# Patient Record
Sex: FEMALE
[De-identification: names, ages, dates, MRNs, and addresses within clinical notes are randomized per-mention and may not be internally consistent; named-entity substitution may affect disease eponyms.]

---

## 2016-02-20 VITALS
SYSTOLIC BLOOD PRESSURE: 108 MMHG | SYSTOLIC BLOOD PRESSURE: 108 MMHG | DIASTOLIC BLOOD PRESSURE: 74 MMHG | DIASTOLIC BLOOD PRESSURE: 74 MMHG

## 2019-02-18 ENCOUNTER — HOSPITAL ENCOUNTER (OUTPATIENT)
Dept: HOSPITAL 61 - 3 SO LND | Age: 29
Setting detail: OBSERVATION
Discharge: HOME | End: 2019-02-18
Attending: SPECIALIST | Admitting: SPECIALIST
Payer: SELF-PAY

## 2019-02-18 VITALS — BODY MASS INDEX: 30.22 KG/M2 | WEIGHT: 142 LBS | HEIGHT: 57.5 IN

## 2019-02-18 DIAGNOSIS — Z3A.38: ICD-10-CM

## 2019-02-18 DIAGNOSIS — O21.2: ICD-10-CM

## 2019-02-18 LAB
ALBUMIN SERPL-MCNC: 2.4 G/DL (ref 3.4–5)
ALBUMIN/GLOB SERPL: 0.6 {RATIO} (ref 1–1.7)
ALP SERPL-CCNC: 194 U/L (ref 46–116)
ALT SERPL-CCNC: 18 U/L (ref 14–59)
AMPHETAMINE/METHAMPHETAMINE: (no result)
ANION GAP SERPL CALC-SCNC: 12 MMOL/L (ref 6–14)
APTT PPP: YELLOW S
AST SERPL-CCNC: 23 U/L (ref 15–37)
BACTERIA #/AREA URNS HPF: (no result) /HPF
BARBITURATES UR-MCNC: (no result) UG/ML
BASOPHILS # BLD AUTO: 0.1 X10^3/UL (ref 0–0.2)
BASOPHILS NFR BLD: 1 % (ref 0–3)
BENZODIAZ UR-MCNC: (no result) UG/L
BILIRUB SERPL-MCNC: 0.2 MG/DL (ref 0.2–1)
BILIRUB UR QL STRIP: NEGATIVE
BUN SERPL-MCNC: 6 MG/DL (ref 7–20)
BUN/CREAT SERPL: 12 (ref 6–20)
CALCIUM SERPL-MCNC: 9.3 MG/DL (ref 8.5–10.1)
CANNABINOIDS UR-MCNC: (no result) UG/L
CHLORIDE SERPL-SCNC: 100 MMOL/L (ref 98–107)
CO2 SERPL-SCNC: 23 MMOL/L (ref 21–32)
COCAINE UR-MCNC: (no result) NG/ML
CREAT SERPL-MCNC: 0.5 MG/DL (ref 0.6–1)
EOSINOPHIL NFR BLD: 0.2 X10^3/UL (ref 0–0.7)
EOSINOPHIL NFR BLD: 2 % (ref 0–3)
ERYTHROCYTE [DISTWIDTH] IN BLOOD BY AUTOMATED COUNT: 14.2 % (ref 11.5–14.5)
FIBRINOGEN PPP-MCNC: CLEAR MG/DL
GFR SERPLBLD BASED ON 1.73 SQ M-ARVRAT: 146.9 ML/MIN
GLOBULIN SER-MCNC: 4.2 G/DL (ref 2.2–3.8)
GLUCOSE SERPL-MCNC: 74 MG/DL (ref 70–99)
HCT VFR BLD CALC: 39.2 % (ref 36–47)
HGB BLD-MCNC: 13 G/DL (ref 12–15.5)
LYMPHOCYTES # BLD: 2.4 X10^3/UL (ref 1–4.8)
LYMPHOCYTES NFR BLD AUTO: 18 % (ref 24–48)
MCH RBC QN AUTO: 29 PG (ref 25–35)
MCHC RBC AUTO-ENTMCNC: 33 G/DL (ref 31–37)
MCV RBC AUTO: 87 FL (ref 79–100)
METHADONE SERPL-MCNC: (no result) NG/ML
MONO #: 0.5 X10^3/UL (ref 0–1.1)
MONOCYTES NFR BLD: 4 % (ref 0–9)
NEUT #: 10.2 X10^3UL (ref 1.8–7.7)
NEUTROPHILS NFR BLD AUTO: 76 % (ref 31–73)
NITRITE UR QL STRIP: NEGATIVE
OPIATES UR-MCNC: (no result) NG/ML
PCP SERPL-MCNC: (no result) MG/DL
PH UR STRIP: 8 [PH]
PLATELET # BLD AUTO: 235 X10^3/UL (ref 140–400)
POTASSIUM SERPL-SCNC: 4.1 MMOL/L (ref 3.5–5.1)
PROT SERPL-MCNC: 6.6 G/DL (ref 6.4–8.2)
PROT UR STRIP-MCNC: NEGATIVE MG/DL
RBC # BLD AUTO: 4.49 X10^6/UL (ref 3.5–5.4)
RBC #/AREA URNS HPF: (no result) /HPF (ref 0–2)
SODIUM SERPL-SCNC: 135 MMOL/L (ref 136–145)
SQUAMOUS #/AREA URNS LPF: (no result) /LPF
UROBILINOGEN UR-MCNC: 0.2 MG/DL
WBC # BLD AUTO: 13.4 X10^3/UL (ref 4–11)
WBC #/AREA URNS HPF: (no result) /HPF (ref 0–4)

## 2019-02-18 PROCEDURE — 36415 COLL VENOUS BLD VENIPUNCTURE: CPT

## 2019-02-18 PROCEDURE — 85025 COMPLETE CBC W/AUTO DIFF WBC: CPT

## 2019-02-18 PROCEDURE — 80053 COMPREHEN METABOLIC PANEL: CPT

## 2019-02-18 PROCEDURE — G0378 HOSPITAL OBSERVATION PER HR: HCPCS

## 2019-02-18 PROCEDURE — 80307 DRUG TEST PRSMV CHEM ANLYZR: CPT

## 2019-02-18 PROCEDURE — 96361 HYDRATE IV INFUSION ADD-ON: CPT

## 2019-02-18 PROCEDURE — 87086 URINE CULTURE/COLONY COUNT: CPT

## 2019-02-18 PROCEDURE — 81001 URINALYSIS AUTO W/SCOPE: CPT

## 2019-02-18 PROCEDURE — 96374 THER/PROPH/DIAG INJ IV PUSH: CPT

## 2019-02-18 PROCEDURE — G0379 DIRECT REFER HOSPITAL OBSERV: HCPCS

## 2019-02-22 ENCOUNTER — HOSPITAL ENCOUNTER (OUTPATIENT)
Dept: HOSPITAL 61 - 3 SO LND | Age: 29
Setting detail: OBSERVATION
Discharge: HOME | End: 2019-02-22
Attending: SPECIALIST | Admitting: SPECIALIST
Payer: SELF-PAY

## 2019-02-22 DIAGNOSIS — Z3A.38: ICD-10-CM

## 2019-02-22 DIAGNOSIS — O26.893: ICD-10-CM

## 2019-02-22 DIAGNOSIS — N89.8: ICD-10-CM

## 2019-02-22 DIAGNOSIS — O42.92: Primary | ICD-10-CM

## 2019-02-22 LAB
AMNIO PT: NEGATIVE
APTT PPP: YELLOW S
BACTERIA #/AREA URNS HPF: (no result) /HPF
BILIRUB UR QL STRIP: NEGATIVE
FIBRINOGEN PPP-MCNC: CLEAR MG/DL
NITRITE UR QL STRIP: NEGATIVE
PH UR STRIP: 7.5 [PH]
PROT UR STRIP-MCNC: NEGATIVE MG/DL
RBC #/AREA URNS HPF: (no result) /HPF (ref 0–2)
SQUAMOUS #/AREA URNS LPF: (no result) /LPF
UROBILINOGEN UR-MCNC: 0.2 MG/DL
WBC #/AREA URNS HPF: (no result) /HPF (ref 0–4)

## 2019-02-22 PROCEDURE — 81001 URINALYSIS AUTO W/SCOPE: CPT

## 2019-02-22 PROCEDURE — 84112 EVAL AMNIOTIC FLUID PROTEIN: CPT

## 2019-02-22 PROCEDURE — 36415 COLL VENOUS BLD VENIPUNCTURE: CPT

## 2019-02-22 PROCEDURE — G0378 HOSPITAL OBSERVATION PER HR: HCPCS

## 2019-02-22 PROCEDURE — 87086 URINE CULTURE/COLONY COUNT: CPT

## 2019-02-22 PROCEDURE — G0379 DIRECT REFER HOSPITAL OBSERV: HCPCS

## 2019-02-24 ENCOUNTER — HOSPITAL ENCOUNTER (INPATIENT)
Dept: HOSPITAL 61 - 3 SO LND | Age: 29
LOS: 3 days | Discharge: HOME | End: 2019-02-27
Attending: SPECIALIST | Admitting: SPECIALIST
Payer: MEDICAID

## 2019-02-24 VITALS — WEIGHT: 144 LBS | BODY MASS INDEX: 29.03 KG/M2 | HEIGHT: 59 IN

## 2019-02-24 VITALS — DIASTOLIC BLOOD PRESSURE: 81 MMHG | SYSTOLIC BLOOD PRESSURE: 130 MMHG

## 2019-02-24 DIAGNOSIS — Z3A.40: ICD-10-CM

## 2019-02-24 LAB
AMORPH SED URNS QL MICRO: PRESENT /HPF
AMPHETAMINE/METHAMPHETAMINE: (no result)
APTT PPP: YELLOW S
BACTERIA #/AREA URNS HPF: (no result) /HPF
BARBITURATES UR-MCNC: (no result) UG/ML
BENZODIAZ UR-MCNC: (no result) UG/L
BILIRUB UR QL STRIP: NEGATIVE
CANNABINOIDS UR-MCNC: (no result) UG/L
COCAINE UR-MCNC: (no result) NG/ML
FIBRINOGEN PPP-MCNC: CLEAR MG/DL
METHADONE SERPL-MCNC: (no result) NG/ML
NITRITE UR QL STRIP: NEGATIVE
OPIATES UR-MCNC: (no result) NG/ML
PCP SERPL-MCNC: (no result) MG/DL
PH UR STRIP: 8 [PH]
PROT UR STRIP-MCNC: NEGATIVE MG/DL
RBC #/AREA URNS HPF: (no result) /HPF (ref 0–2)
SQUAMOUS #/AREA URNS LPF: (no result) /LPF
UROBILINOGEN UR-MCNC: 0.2 MG/DL
WBC #/AREA URNS HPF: (no result) /HPF (ref 0–4)

## 2019-02-24 PROCEDURE — 87086 URINE CULTURE/COLONY COUNT: CPT

## 2019-02-24 PROCEDURE — 86592 SYPHILIS TEST NON-TREP QUAL: CPT

## 2019-02-24 PROCEDURE — 86901 BLOOD TYPING SEROLOGIC RH(D): CPT

## 2019-02-24 PROCEDURE — 85014 HEMATOCRIT: CPT

## 2019-02-24 PROCEDURE — 36415 COLL VENOUS BLD VENIPUNCTURE: CPT

## 2019-02-24 PROCEDURE — 85025 COMPLETE CBC W/AUTO DIFF WBC: CPT

## 2019-02-24 PROCEDURE — 86900 BLOOD TYPING SEROLOGIC ABO: CPT

## 2019-02-24 PROCEDURE — 80307 DRUG TEST PRSMV CHEM ANLYZR: CPT

## 2019-02-24 PROCEDURE — 86850 RBC ANTIBODY SCREEN: CPT

## 2019-02-24 PROCEDURE — 81001 URINALYSIS AUTO W/SCOPE: CPT

## 2019-02-25 VITALS — SYSTOLIC BLOOD PRESSURE: 97 MMHG | DIASTOLIC BLOOD PRESSURE: 55 MMHG

## 2019-02-25 VITALS — DIASTOLIC BLOOD PRESSURE: 70 MMHG | SYSTOLIC BLOOD PRESSURE: 113 MMHG

## 2019-02-25 VITALS — DIASTOLIC BLOOD PRESSURE: 63 MMHG | SYSTOLIC BLOOD PRESSURE: 109 MMHG

## 2019-02-25 VITALS — DIASTOLIC BLOOD PRESSURE: 64 MMHG | SYSTOLIC BLOOD PRESSURE: 113 MMHG

## 2019-02-25 VITALS — SYSTOLIC BLOOD PRESSURE: 97 MMHG | DIASTOLIC BLOOD PRESSURE: 60 MMHG

## 2019-02-25 LAB
BASOPHILS # BLD AUTO: 0 X10^3/UL (ref 0–0.2)
BASOPHILS NFR BLD: 0 % (ref 0–3)
EOSINOPHIL NFR BLD: 0.3 X10^3/UL (ref 0–0.7)
EOSINOPHIL NFR BLD: 2 % (ref 0–3)
ERYTHROCYTE [DISTWIDTH] IN BLOOD BY AUTOMATED COUNT: 14.1 % (ref 11.5–14.5)
HCT VFR BLD CALC: 39.3 % (ref 36–47)
HGB BLD-MCNC: 12.9 G/DL (ref 12–15.5)
LYMPHOCYTES # BLD: 3.5 X10^3/UL (ref 1–4.8)
LYMPHOCYTES NFR BLD AUTO: 22 % (ref 24–48)
MCH RBC QN AUTO: 29 PG (ref 25–35)
MCHC RBC AUTO-ENTMCNC: 33 G/DL (ref 31–37)
MCV RBC AUTO: 87 FL (ref 79–100)
MONO #: 0.6 X10^3/UL (ref 0–1.1)
MONOCYTES NFR BLD: 4 % (ref 0–9)
NEUT #: 11 X10^3UL (ref 1.8–7.7)
NEUTROPHILS NFR BLD AUTO: 71 % (ref 31–73)
PLATELET # BLD AUTO: 257 X10^3/UL (ref 140–400)
RBC # BLD AUTO: 4.53 X10^6/UL (ref 3.5–5.4)
WBC # BLD AUTO: 15.4 X10^3/UL (ref 4–11)

## 2019-02-25 RX ADMIN — DOCUSATE SODIUM PRN MG: 100 CAPSULE, LIQUID FILLED ORAL at 19:49

## 2019-02-25 RX ADMIN — HYDROCODONE BITARTRATE AND ACETAMINOPHEN PRN TAB: 5; 325 TABLET ORAL at 12:37

## 2019-02-25 RX ADMIN — NALBUPHINE HYDROCHLORIDE PRN MG: 10 INJECTION, SOLUTION INTRAMUSCULAR; INTRAVENOUS; SUBCUTANEOUS at 01:14

## 2019-02-25 RX ADMIN — NALBUPHINE HYDROCHLORIDE PRN MG: 10 INJECTION, SOLUTION INTRAMUSCULAR; INTRAVENOUS; SUBCUTANEOUS at 02:25

## 2019-02-25 RX ADMIN — HYDROCODONE BITARTRATE AND ACETAMINOPHEN PRN TAB: 5; 325 TABLET ORAL at 08:35

## 2019-02-25 RX ADMIN — IBUPROFEN SCH MG: 400 TABLET ORAL at 19:50

## 2019-02-25 RX ADMIN — IBUPROFEN SCH MG: 400 TABLET ORAL at 06:17

## 2019-02-25 RX ADMIN — DOCUSATE SODIUM PRN MG: 100 CAPSULE, LIQUID FILLED ORAL at 08:34

## 2019-02-26 VITALS — DIASTOLIC BLOOD PRESSURE: 73 MMHG | SYSTOLIC BLOOD PRESSURE: 112 MMHG

## 2019-02-26 VITALS — DIASTOLIC BLOOD PRESSURE: 62 MMHG | SYSTOLIC BLOOD PRESSURE: 91 MMHG

## 2019-02-26 VITALS — DIASTOLIC BLOOD PRESSURE: 67 MMHG | SYSTOLIC BLOOD PRESSURE: 100 MMHG

## 2019-02-26 VITALS — SYSTOLIC BLOOD PRESSURE: 97 MMHG | DIASTOLIC BLOOD PRESSURE: 56 MMHG

## 2019-02-26 RX ADMIN — IBUPROFEN SCH MG: 400 TABLET ORAL at 05:40

## 2019-02-26 RX ADMIN — IBUPROFEN SCH MG: 400 TABLET ORAL at 15:27

## 2019-02-26 RX ADMIN — IBUPROFEN SCH MG: 400 TABLET ORAL at 21:54

## 2019-02-26 RX ADMIN — DOCUSATE SODIUM PRN MG: 100 CAPSULE, LIQUID FILLED ORAL at 21:53

## 2019-02-26 RX ADMIN — DOCUSATE SODIUM PRN MG: 100 CAPSULE, LIQUID FILLED ORAL at 15:27

## 2019-02-26 NOTE — PDOC
Provider Note


Provider Note


Doing well


VSS


Uterus NTTP


FU in AM











FAM DINH MD Feb 26, 2019 13:31

## 2019-02-27 VITALS — DIASTOLIC BLOOD PRESSURE: 63 MMHG | SYSTOLIC BLOOD PRESSURE: 99 MMHG

## 2019-02-27 VITALS — SYSTOLIC BLOOD PRESSURE: 110 MMHG | DIASTOLIC BLOOD PRESSURE: 78 MMHG

## 2019-02-27 VITALS — SYSTOLIC BLOOD PRESSURE: 99 MMHG | DIASTOLIC BLOOD PRESSURE: 66 MMHG

## 2019-02-27 RX ADMIN — IBUPROFEN SCH MG: 400 TABLET ORAL at 06:27

## 2019-02-27 RX ADMIN — DOCUSATE SODIUM PRN MG: 100 CAPSULE, LIQUID FILLED ORAL at 08:29

## 2019-02-27 NOTE — PDOC3
OB DISCHARGE SUMMARY


DATE OF ADMISSION:  


2/25/19


DATE OF DISCHARGE:  


2/17/19


REASON FOR ADMISSION:  Onset of labor


INTRAPARTUM PROCEDURES:  Spontanous Vag Deliv


DISCHARGE DIAGNOSIS:  Term Pregnancy Delivered


DISCHARGE INFORMATION:  Activity (ad chantell), Diet (regular), Instructions (pelvic 

rest x 6 wks.)


HOSPITAL COURSE


Term gestation delivered vaginally without any complications.











YESICA CAAL Jr, MD Feb 27, 2019 16:52

## 2019-03-11 NOTE — PDOC1
OB - History


Hx of Present Pregnancy


Prenatal Care:  Good Care


Ultrasounds:  Normal mid trimester US


Obstetrical Complications:  None


Medical Complications:  None





Past Family/Social History


*


Past Medical, Surgical, Family and Obstetric Histories reviewed from prenatal 

chart.


Blood Type:  O+


Rubella:  Immune


RPR/VDRL:  Negative


HBsAG:  Negative





OB - Chief Complaint & HPI


Date of Admission:


Date of Admission:  2019 at 22:55


Chief Complaint/History


:  2


Para:  1


EDC:  Mar 6, 2019


Reason for admission:  active labor


Admission Nurse Assessment Rev:  Yes





OB - Admission Exam


Physical Exam


HEENT:  Normal, Nasal Mucosa Normal, Oropharynx Normal, Moist Membranes, 

Fontanelles Normal


Heart:  Regular Rate


Lungs:  Clear, Equal


Abdomen:  Gravid


Extremities:  Normal Pulses, No tenderness or swelling


Reflexes:  Normal


Effacement:  100%


Membranes:  Ruptured


Amniotic Fluid:  Clear


Fetal Heart Rate:  Normal


Accelerations:  Accelerations Present


Decelerations:  Variable decelerations


Short Term Variability:  Present


Long Term Variability:  Moderate


Contractions on Admission:  < 5 Minutes Apart


Intensity:  Firm





Assessment/Plan


Assessment/Plan


TIUP


Active labor


ACS











FAM DINH MD Mar 11, 2019 08:18

## 2019-03-11 NOTE — PDOC
VAGINAL DELIVERY


DATE


DATE: 3/11/19 


TIME: 08:18


:  2


Para:  1


EDC:  Mar 6, 2019


VAGINAL DELIVERY:  VTX


VACCUM ASSISTED:  No


PLACENTA:  Spontaneous


APGAR





SEX:  Male


WEIGHT


Weight [ ]


EPISIOTOMY:  No


EXTENSION:  Yes


EBL


300cc


COMPLICATIONS


None


CONDITION


Stable


Signs of Intrauterine Infectio:  None


Shoulder Dystocia:  No


DIAGNOSIS


FAM Woodruff MD Mar 11, 2019 08:20

## 2021-10-22 ENCOUNTER — HOSPITAL ENCOUNTER (EMERGENCY)
Dept: HOSPITAL 61 - ER | Age: 31
Discharge: HOME | End: 2021-10-22
Payer: SELF-PAY

## 2021-10-22 VITALS — WEIGHT: 130.07 LBS | HEIGHT: 61 IN | BODY MASS INDEX: 24.56 KG/M2

## 2021-10-22 VITALS — SYSTOLIC BLOOD PRESSURE: 115 MMHG | DIASTOLIC BLOOD PRESSURE: 56 MMHG

## 2021-10-22 DIAGNOSIS — O03.9: Primary | ICD-10-CM

## 2021-10-22 DIAGNOSIS — Z3A.08: ICD-10-CM

## 2021-10-22 LAB
BASOPHILS # BLD AUTO: 0 X10^3/UL (ref 0–0.2)
BASOPHILS NFR BLD: 1 % (ref 0–3)
EOSINOPHIL NFR BLD: 0.2 X10^3/UL (ref 0–0.7)
EOSINOPHIL NFR BLD: 3 % (ref 0–3)
ERYTHROCYTE [DISTWIDTH] IN BLOOD BY AUTOMATED COUNT: 13.7 % (ref 11.5–14.5)
HCT VFR BLD CALC: 38.9 % (ref 36–47)
HGB BLD-MCNC: 12.9 G/DL (ref 12–15.5)
LYMPHOCYTES # BLD: 2.5 X10^3/UL (ref 1–4.8)
LYMPHOCYTES NFR BLD AUTO: 31 % (ref 24–48)
MCH RBC QN AUTO: 29 PG (ref 25–35)
MCHC RBC AUTO-ENTMCNC: 33 G/DL (ref 31–37)
MCV RBC AUTO: 88 FL (ref 79–100)
MONO #: 0.3 X10^3/UL (ref 0–1.1)
MONOCYTES NFR BLD: 4 % (ref 0–9)
NEUT #: 5 X10^3/UL (ref 1.8–7.7)
NEUTROPHILS NFR BLD AUTO: 62 % (ref 31–73)
PLATELET # BLD AUTO: 223 X10^3/UL (ref 140–400)
RBC # BLD AUTO: 4.43 X10^6/UL (ref 3.5–5.4)
WBC # BLD AUTO: 8.1 X10^3/UL (ref 4–11)

## 2021-10-22 PROCEDURE — 76801 OB US < 14 WKS SINGLE FETUS: CPT

## 2021-10-22 PROCEDURE — 86900 BLOOD TYPING SEROLOGIC ABO: CPT

## 2021-10-22 PROCEDURE — 85025 COMPLETE CBC W/AUTO DIFF WBC: CPT

## 2021-10-22 PROCEDURE — 86901 BLOOD TYPING SEROLOGIC RH(D): CPT

## 2021-10-22 PROCEDURE — 81025 URINE PREGNANCY TEST: CPT

## 2021-10-22 PROCEDURE — 84702 CHORIONIC GONADOTROPIN TEST: CPT

## 2021-10-22 PROCEDURE — 36415 COLL VENOUS BLD VENIPUNCTURE: CPT

## 2021-10-22 NOTE — PHYS DOC
Past Medical History


Past Surgical History:  No Surgical History





General Adult


EDM:


Chief Complaint:  VAGINAL BLEEDING PREGNANCY





HPI:


HPI:





Patient is a 31  year old female G3, P2 at approximately 8 weeks gestational age

who presents with vaginal bleeding, back pain, and uterine 

cramping/contractions.


States that she began bleeding approximately 1 week ago.  Has passed clots, and 

yesterday, passed thicker tissue-like material.


She has had an ultrasound at approximately 7 weeks it did reveal an IUP.  This 

was done at a clinic, however, she does not know the name of the clinic.


States that she does not have any regular OB follow-up.





Review of Systems:


Review of Systems:


Constitutional:   Denies fever or chills. []


Eyes:   Denies change in visual acuity. []


HENT:   Denies nasal congestion or sore throat. [] 


Respiratory:   Denies cough or shortness of breath. [] 


Cardiovascular:   Denies chest pain or edema. [] 


GI: Reports lower abdominal/uterine pain.  Denies nausea, vomiting, bloody 

stools or diarrhea. [] 


: Reports vaginal bleeding, uterine cramping, ?  Passage of tissue


Musculoskeletal: Reports low back pain.  Denies joint pain. [] 


Integument:   Denies rash. [] 


Neurologic:   Denies headache, focal weakness or sensory changes. [] 


Endocrine:   Denies polyuria or polydipsia. [] 


Lymphatic:  Denies swollen glands. [] 


Psychiatric:  Denies depression or anxiety. []





Heart Score:


C/O Chest Pain:  N/A





Allergies:


Allergies:





Allergies








Coded Allergies Type Severity Reaction Last Updated Verified


 


  No Known Drug Allergies    16 No











Physical Exam:


PE:





Constitutional: Well developed, well nourished, no acute distress, non-toxic 

appearance. []


HENT: Normocephalic, atraumatic, bilateral external ears normal, oropharynx 

moist, no oral exudates, nose normal. []


Eyes: PERRLA, EOMI, conjunctiva normal, no discharge. [] 


Neck: Normal range of motion, no tenderness, supple, no stridor. [] 


Cardiovascular:Heart rate regular rhythm, no murmur []


Lungs & Thorax:  Bilateral breath sounds clear to auscultation []


Abdomen: Mild lower abdominal tenderness to palpation.  Soft, nonrigid.  No 

rebound.  


:  External genitalia normal.  Small amount of blood present in the vaginal 

vault.  Cervix visualized without lesions.  Cervix does appear open and had a 

small amount of what appears to be fetal tissue present.  This was removed with 

forceps.  No ongoing bleeding was noted.


Skin: Warm, dry, no erythema, no rash. [] 


Back: No tenderness, no CVA tenderness. [] 


Extremities: No tenderness, no cyanosis, no clubbing, ROM intact, no edema. [] 


Neurologic: Alert and oriented X 3, normal motor function, normal sensory 

function, no focal deficits noted. []


Psychologic: Affect normal, judgement normal, mood normal. []





Current Patient Data:


Labs:





                                Laboratory Tests








Test


 10/22/21


09:31


 


POC Urine HCG, Qualitative


 Hcg positive


(Negative)








Vital Signs:





                                   Vital Signs








  Date Time  Temp Pulse Resp B/P (MAP) Pulse Ox O2 Delivery O2 Flow Rate FiO2


 


10/22/21 09:31 98.5 60 16 101/61 (74) 99 Room Air  





 98.5       











EKG:


EKG:


[]





Radiology/Procedures:


Radiology/Procedures:


[]


Impression:


                            [facility]


                    [address] [place] [address]


                                 [phone]


                                        


                                 IMAGING REPORT





                                     Signed





PATIENT: PATRICIA NAVA ACCOUNT: [id number]     MRN#: [id number]


: 1990           LOCATION: ER              AGE: 31


SEX: F                    EXAM DT: 10/22/21         ACCESSION#: [id number]


STATUS: REG ER            ORD. PHYSICIAN: FAM PAREKH MD


REASON: 1st trimester bleeding, cramping


PROCEDURE: OB < 14 WKS





OB ultrasound less than 14 weeks 10/22/2021





CLINICAL HISTORY: First trimester pregnancy with uncertain LMP with vaginal 

bleeding and cramping.





TECHNIQUE: Using the distended urinary bladder as a sonographic window, a real-

time ultrasound examination of the pelvis was performed. Additionally in an 

attempt to better evaluate the uterus and adnexa, a transvaginal ultrasound 

study was performed. Multiple images were obtained.





FINDINGS: The uterus is within normal limits in size and echogenicity. It 

measures 8.1 x 4.4 x 4.1 cm in longitudinal, transverse, and the AP dimensions. 

The endometrial echo complex measures 7 mm in thickness which is within normal 

limits. No gestational sac is seen within the endometrial canal. No focal 

abnormality of the uterus is seen.





Both ovaries are within normal limits in size and echogenicity. The right ovary 

measures 2.4 x 1.4 x 1.4 cm in size. The left ovary measures 2.0 x 1.3 x 1.1 cm 

in size. A 9 mm follicle is seen within the right ovary. No adnexal mass is 

seen. Normal color-flow and pulse Doppler imaging to both ovaries is seen. No 

free fluid is noted.





IMPRESSION: Negative study. No IUP is seen. This ultrasound finding could be 

seen with an very early IUP, missed spontaneous  or possibly due to an 

occult ectopic pregnancy. Clinical correlation and correlation with the 

patient's serial beta hCG level is recommended.





Electronically signed by: Franco Morin MD (10/22/2021 11:46 AM) [id number]














DICTATED and SIGNED BY:     FRANCO MORIN MD


DATE:     10/22/21 [id number] 0





Course & Med Decision Making:


Course & Med Decision Making


Pertinent Labs and Imaging studies reviewed. (See chart for details)





Patient is a 31-year-old female G3, P2 at approximately 8 weeks gestational age 

who presents with a week of vaginal bleeding, lower abdominal 

cramping/contractions, low back pain, and passage of ?  Fetal tissue.


Hemodynamically stable on arrival.


Well-appearing on exam.


Concern for miscarriage.  Ultrasound, beta-hCG, blood counts, and blood type 

(since Rh status is unknown) ordered.


1155





B-hcg 649. 


US without IUP.


Given she had a confirmed IUP ~ 1 week ago, this is likely a miscarriage. 


Blood type: O+. No rhogam indicated. 


Small amount of likely fetal tissue was seen on pelvic examination and removed 

with forceps.  Cervical os appeared open.


She was provided with an OB follow up.


Explained expected clinical course and given return precautions for increased 

bleeding, pain, or fever/chills.


1246





Dragon Disclaimer:


Dragon Disclaimer:


This electronic medical record was generated, in whole or in part, using a voice

 recognition dictation system.





Departure


Departure


Impression:  


   Primary Impression:  


   Miscarriage


Disposition:   HOME HEALTH CARE SERVICE


Condition:  STABLE


Referrals:  


NO PCP (PCP)








KOREY DAVILA MD


Schedule a follow up appointment.


Patient Instructions:  Miscarriage











FAM PAREKH MD              Oct 22, 2021 11:55

## 2021-10-22 NOTE — RAD
OB ultrasound less than 14 weeks 10/22/2021



CLINICAL HISTORY: First trimester pregnancy with uncertain LMP with vaginal bleeding and cramping.



TECHNIQUE: Using the distended urinary bladder as a sonographic window, a real-time ultrasound examin
ation of the pelvis was performed. Additionally in an attempt to better evaluate the uterus and adnex
a, a transvaginal ultrasound study was performed. Multiple images were obtained.



FINDINGS: The uterus is within normal limits in size and echogenicity. It measures 8.1 x 4.4 x 4.1 cm
 in longitudinal, transverse, and the AP dimensions. The endometrial echo complex measures 7 mm in th
ickness which is within normal limits. No gestational sac is seen within the endometrial canal. No fo
patricia abnormality of the uterus is seen.



Both ovaries are within normal limits in size and echogenicity. The right ovary measures 2.4 x 1.4 x 
1.4 cm in size. The left ovary measures 2.0 x 1.3 x 1.1 cm in size. A 9 mm follicle is seen within th
e right ovary. No adnexal mass is seen. Normal color-flow and pulse Doppler imaging to both ovaries i
s seen. No free fluid is noted.



IMPRESSION: Negative study. No IUP is seen. This ultrasound finding could be seen with an very early 
IUP, missed spontaneous  or possibly due to an occult ectopic pregnancy. Clinical correlation
 and correlation with the patient's serial beta hCG level is recommended.



Electronically signed by: Frnaco Morin MD (10/22/2021 11:46 AM) [id number]

## 2021-12-03 ENCOUNTER — HOSPITAL ENCOUNTER (EMERGENCY)
Dept: HOSPITAL 61 - ER | Age: 31
Discharge: HOME | End: 2021-12-03
Payer: SELF-PAY

## 2021-12-03 VITALS — WEIGHT: 126.55 LBS | BODY MASS INDEX: 23.29 KG/M2 | HEIGHT: 62 IN

## 2021-12-03 VITALS — SYSTOLIC BLOOD PRESSURE: 111 MMHG | DIASTOLIC BLOOD PRESSURE: 53 MMHG

## 2021-12-03 DIAGNOSIS — O23.41: Primary | ICD-10-CM

## 2021-12-03 DIAGNOSIS — Z20.822: ICD-10-CM

## 2021-12-03 LAB
ALBUMIN SERPL-MCNC: 3.4 G/DL (ref 3.4–5)
ALBUMIN/GLOB SERPL: 0.8 {RATIO} (ref 1–1.7)
ALP SERPL-CCNC: 97 U/L (ref 46–116)
ALT SERPL-CCNC: 28 U/L (ref 14–59)
ANION GAP SERPL CALC-SCNC: 2 MMOL/L (ref 6–14)
APTT PPP: YELLOW S
AST SERPL-CCNC: 20 U/L (ref 15–37)
BACTERIA #/AREA URNS HPF: (no result) /HPF
BASOPHILS # BLD AUTO: 0 X10^3/UL (ref 0–0.2)
BASOPHILS NFR BLD: 0 % (ref 0–3)
BILIRUB SERPL-MCNC: 0.3 MG/DL (ref 0.2–1)
BILIRUB UR QL STRIP: NEGATIVE
BUN SERPL-MCNC: 10 MG/DL (ref 7–20)
BUN/CREAT SERPL: 17 (ref 6–20)
CALCIUM SERPL-MCNC: 9.1 MG/DL (ref 8.5–10.1)
CHLORIDE SERPL-SCNC: 101 MMOL/L (ref 98–107)
CO2 SERPL-SCNC: 27 MMOL/L (ref 21–32)
CREAT SERPL-MCNC: 0.6 MG/DL (ref 0.6–1)
EOSINOPHIL NFR BLD: 0.2 X10^3/UL (ref 0–0.7)
EOSINOPHIL NFR BLD: 3 % (ref 0–3)
ERYTHROCYTE [DISTWIDTH] IN BLOOD BY AUTOMATED COUNT: 13.7 % (ref 11.5–14.5)
FIBRINOGEN PPP-MCNC: CLEAR MG/DL
GFR SERPLBLD BASED ON 1.73 SQ M-ARVRAT: 117.4 ML/MIN
GLUCOSE SERPL-MCNC: 77 MG/DL (ref 70–99)
HCT VFR BLD CALC: 41.1 % (ref 36–47)
HGB BLD-MCNC: 13.7 G/DL (ref 12–15.5)
INFLUENZA A PATIENT: NEGATIVE
INFLUENZA B PATIENT: NEGATIVE
LIPASE: 87 U/L (ref 73–393)
LYMPHOCYTES # BLD: 3 X10^3/UL (ref 1–4.8)
LYMPHOCYTES NFR BLD AUTO: 33 % (ref 24–48)
MCH RBC QN AUTO: 29 PG (ref 25–35)
MCHC RBC AUTO-ENTMCNC: 33 G/DL (ref 31–37)
MCV RBC AUTO: 88 FL (ref 79–100)
MONO #: 0.4 X10^3/UL (ref 0–1.1)
MONOCYTES NFR BLD: 4 % (ref 0–9)
NEUT #: 5.5 X10^3/UL (ref 1.8–7.7)
NEUTROPHILS NFR BLD AUTO: 60 % (ref 31–73)
NITRITE UR QL STRIP: NEGATIVE
PH UR STRIP: 7.5 [PH]
PLATELET # BLD AUTO: 249 X10^3/UL (ref 140–400)
POTASSIUM SERPL-SCNC: 3.8 MMOL/L (ref 3.5–5.1)
PROT SERPL-MCNC: 7.9 G/DL (ref 6.4–8.2)
PROT UR STRIP-MCNC: NEGATIVE MG/DL
RBC # BLD AUTO: 4.68 X10^6/UL (ref 3.5–5.4)
RBC #/AREA URNS HPF: 0 /HPF (ref 0–2)
SODIUM SERPL-SCNC: 130 MMOL/L (ref 136–145)
U PREG PATIENT: POSITIVE
UROBILINOGEN UR-MCNC: 0.2 MG/DL
WBC # BLD AUTO: 9.2 X10^3/UL (ref 4–11)
WBC #/AREA URNS HPF: (no result) /HPF (ref 0–4)

## 2021-12-03 PROCEDURE — 80053 COMPREHEN METABOLIC PANEL: CPT

## 2021-12-03 PROCEDURE — U0003 INFECTIOUS AGENT DETECTION BY NUCLEIC ACID (DNA OR RNA); SEVERE ACUTE RESPIRATORY SYNDROME CORONAVIRUS 2 (SARS-COV-2) (CORONAVIRUS DISEASE [COVID-19]), AMPLIFIED PROBE TECHNIQUE, MAKING USE OF HIGH THROUGHPUT TECHNOLOGIES AS DESCRIBED BY CMS-2020-01-R: HCPCS

## 2021-12-03 PROCEDURE — 83605 ASSAY OF LACTIC ACID: CPT

## 2021-12-03 PROCEDURE — 99285 EMERGENCY DEPT VISIT HI MDM: CPT

## 2021-12-03 PROCEDURE — 81001 URINALYSIS AUTO W/SCOPE: CPT

## 2021-12-03 PROCEDURE — 81025 URINE PREGNANCY TEST: CPT

## 2021-12-03 PROCEDURE — 96360 HYDRATION IV INFUSION INIT: CPT

## 2021-12-03 PROCEDURE — 83690 ASSAY OF LIPASE: CPT

## 2021-12-03 PROCEDURE — 84702 CHORIONIC GONADOTROPIN TEST: CPT

## 2021-12-03 PROCEDURE — 85025 COMPLETE CBC W/AUTO DIFF WBC: CPT

## 2021-12-03 PROCEDURE — 87147 CULTURE TYPE IMMUNOLOGIC: CPT

## 2021-12-03 PROCEDURE — 87591 N.GONORRHOEAE DNA AMP PROB: CPT

## 2021-12-03 PROCEDURE — 87086 URINE CULTURE/COLONY COUNT: CPT

## 2021-12-03 PROCEDURE — 76817 TRANSVAGINAL US OBSTETRIC: CPT

## 2021-12-03 PROCEDURE — 36415 COLL VENOUS BLD VENIPUNCTURE: CPT

## 2021-12-03 PROCEDURE — 87804 INFLUENZA ASSAY W/OPTIC: CPT

## 2021-12-03 PROCEDURE — 87426 SARSCOV CORONAVIRUS AG IA: CPT

## 2021-12-03 PROCEDURE — 87491 CHLMYD TRACH DNA AMP PROBE: CPT

## 2021-12-03 PROCEDURE — 76801 OB US < 14 WKS SINGLE FETUS: CPT

## 2021-12-03 NOTE — RAD
US OB <14 WKS +TV



History: Reason: MISCARRIAGE 1 MONTH AGO, NEVER FOLLOWED UP, FEVER, CHILLS / Spl. Instructions:  / Hi
story: 



Comparison: October 22, 2021. 



Technique: Grayscale and color Doppler imaging of the pelvis was performed using transabdominal techn
ique.



Findings:

The uterus measures 8.4 x 4.7 x 3.6 cm.  



Single intrauterine gestational sac with regular appearance. Yolk sac is identified. Fetal pole is id
entified with crown-rump length 0.25 cm. Estimated gestational age by ultrasound 5 weeks 6 days. Feta
l heart rate 103 bpm. No perigestational fluid collection.



Right ovary measures 2.8 x 2.3 x 2.0 cm. Dominant right ovarian follicle measures 1.8 cm, may represe
nt corpus luteal cyst.



Left ovary measures 2.4 x 1.0 x 1.3 cm. 



Normal Doppler flow to the ovaries bilaterally. No adnexal masses are seen.



IMPRESSION:

1.  Single intrauterine pregnancy with gestational age 5 weeks 6 days and fetal heart rate 103 bpm. R
ecommend continued ultrasound follow-up and routine fetal anatomic screening at 18-22 weeks. 



Electronically signed by: Mango Fish DO (12/3/2021 12:04 PM) [id number]

## 2021-12-03 NOTE — PHYS DOC
Past Medical History


Past Surgical History:  Other


Additional Past Surgical Histo:   10/2021


Smoking Status:  Never Smoker


Alcohol Use:  None





General Adult


EDM:


Chief Complaint:  OTHER COMPLAINTS





HPI:


HPI:





Patient is a 31  year old female who presents with miscarriage  and 

was seen here but never followed up with a OB doctor.  She states 3 weeks ago 

she took a pregnancy test and it was positive.  She states she is having back 

pain, chills and generalized weakness.  She is worried she is having another 

miscarriage or may be there was products of conception still in her uterus.  She

is G3, P2.  She rates her back pain a 7 out of 10.  She denies chest pain, 

shortness of breath, nausea, vomiting, diarrhea, vaginal discharge, vaginal 

bleeding, concern for STD, urinary symptoms, constipation headache, dizziness.





Review of Systems:


Review of Systems:


Constitutional:   Denies fever or +chills. []


Eyes:   Denies change in visual acuity. []


HENT:   Denies nasal congestion or sore throat. [] 


Respiratory:   Denies cough or shortness of breath. [] 


Cardiovascular:   Denies chest pain or edema. [] 


GI:   Denies abdominal pain, nausea, vomiting, bloody stools or diarrhea. [] 


:  Denies dysuria. [] 


Musculoskeletal:  + Lower back pain or denies joint pain. [] 


Integument:   Denies rash. [] 


Neurologic:   Denies headache, focal weakness or sensory changes. + Generalized 

weakness [] 


Endocrine:   Denies polyuria or polydipsia. [] 


Lymphatic:  Denies swollen glands. [] 


Psychiatric:  Denies depression or anxiety. []





Heart Score:


C/O Chest Pain:  No





Current Medications:





Current Medications








 Medications


  (Trade)  Dose


 Ordered  Sig/[facility]  Start Time


 Stop Time Status Last Admin


Dose Admin


 


 Acetaminophen


  (Tylenol)  1,000 mg  1X  ONCE  12/3/21 09:30


 12/3/21 09:31 DC  





 


 Sodium Chloride  1,000 ml @ 


 1,000 mls/hr  Q1H  12/3/21 09:30


 12/3/21 10:29 DC  














Allergies:


Allergies:





Allergies








Coded Allergies Type Severity Reaction Last Updated Verified


 


  No Known Drug Allergies    16 No











Physical Exam:


PE:





Constitutional: Well developed, well nourished, no acute distress, non-toxic 

appearance. []


HENT: Normocephalic, atraumatic, bilateral external ears normal, oropharynx 

moist, no oral exudates, nose normal. []


Eyes: PERRLA, EOMI, conjunctiva normal, no discharge. [] 


Neck: Normal range of motion, no tenderness, supple, no stridor. [] 


Cardiovascular:Heart rate regular rhythm, no murmur []


Lungs & Thorax:  Bilateral breath sounds clear to auscultation []


Abdomen: Bowel sounds normal, soft, no tenderness, no masses, no pulsatile 

masses. [] 


Skin: Warm, dry, no erythema, no rash. [] 


Back: No tenderness, no CVA tenderness. [] 


Extremities: No tenderness, no cyanosis, no clubbing, ROM intact, no edema. [] 


Neurologic: Alert and oriented X 3, normal motor function, normal sensory functi

on, no focal deficits noted. []


Psychologic: Affect normal, judgement normal, mood normal. []





**Normal physical exam





Current Patient Data:


Labs:





                                Laboratory Tests








Test


 12/3/21


08:58


 


Urine Collection Type Unknown  


 


Urine Color Yellow  


 


Urine Clarity Clear  


 


Urine pH


 7.5 (<5.0-8.0)





 


Urine Specific Gravity


 1.025


(1.000-1.030)


 


Urine Protein


 Negative mg/dL


(NEG-TRACE)


 


Urine Glucose (UA)


 Negative mg/dL


(NEG)


 


Urine Ketones (Stick)


 Negative mg/dL


(NEG)


 


Urine Blood


 Negative (NEG)





 


Urine Nitrite


 Negative (NEG)





 


Urine Bilirubin


 Negative (NEG)





 


Urine Urobilinogen Dipstick


 0.2 mg/dL (0.2


mg/dL)


 


Urine Leukocyte Esterase Small (NEG)  


 


Urine RBC 0 /HPF (0-2)  


 


Urine WBC


 1-4 /HPF (0-4)





 


Urine Squamous Epithelial


Cells Few /LPF  





 


Urine Bacteria


 Few /HPF


(0-FEW)








Vital Signs:





                                   Vital Signs








  Date Time  Temp Pulse Resp B/P (MAP) Pulse Ox O2 Delivery O2 Flow Rate FiO2


 


12/3/21 08:36 98.2 67 16 108/61 (77) 98 Room Air  





 98.2       











EKG:


EKG:


[]





Radiology/Procedures:


Radiology/Procedures:


[]


Impression:


[facility]


[address] [place] [address]


[phone]





IMAGING REPORT





Signed





PATIENT: PATRICIA NAVA   ACCOUNT: [id number]   MRN#: [id number]


: 1990   LOCATION: ER   AGE: 31


SEX: F   EXAM DT: 21   ACCESSION#: [id number]


STATUS: REG ER   ORD. PHYSICIAN: JAKE FREY   


REASON: MISCARRIAGE 1 MONTH AGO, NEVER FOLLOWED UP, FEVER, CHILLS


PROCEDURE: OB <14 WKS W/TV





US OB <14 WKS +TV





History: Reason: MISCARRIAGE 1 MONTH AGO, NEVER FOLLOWED UP, FEVER, CHILLS / 

Spl. Instructions:  / History: 





Comparison: 2021. 





Technique: Grayscale and color Doppler imaging of the pelvis was performed using

 transabdominal technique.





Findings:


The uterus measures 8.4 x 4.7 x 3.6 cm.  





Single intrauterine gestational sac with regular appearance. Yolk sac is 

identified. Fetal pole is identified with crown-rump length 0.25 cm. Estimated 

gestational age by ultrasound 5 weeks 6 days. Fetal heart rate 103 bpm. No perig

estational fluid collection.





Right ovary measures 2.8 x 2.3 x 2.0 cm. Dominant right ovarian follicle 

measures 1.8 cm, may represent corpus luteal cyst.





Left ovary measures 2.4 x 1.0 x 1.3 cm. 





Normal Doppler flow to the ovaries bilaterally. No adnexal masses are seen.





IMPRESSION:


1.  Single intrauterine pregnancy with gestational age 5 weeks 6 days and fetal 

heart rate 103 bpm. Recommend continued ultrasound follow-up and routine fetal 

anatomic screening at 18-22 weeks. 





Electronically signed by: Gita Davila DO (12/3/2021 12:04 PM) [id number]














DICTATED and SIGNED BY:     GITA DAVILA DO


DATE:     21 [id number] 0





Course & Med Decision Making:


Course & Med Decision Making


Pertinent Labs and Imaging studies reviewed. (See chart for details)





See HPI.  Alert and oriented x4.  Ambulatory with steady gait.  Speaks in full 

clear sentences.  Skin pink warm and dry.  Abdomen is soft and nontender.  No 

CVA tenderness.  Afebrile.





[]





Dragon Disclaimer:


Dragon Disclaimer:


This electronic medical record was generated, in whole or in part, using a voice

 recognition dictation system.





Departure


Departure


Impression:  


   Primary Impression:  


   Abdominal pain affecting pregnancy


   Additional Impression:  


   UTI (urinary tract infection) during pregnancy


   Qualified Codes:  O23.41 - Unspecified infection of urinary tract in 

   pregnancy, first trimester


Disposition:   HOME / SELF CARE / HOMELESS


Condition:  STABLE


Referrals:  


NO PCP (PCP)


Patient Instructions:  Abdominal Pain During Pregnancy, Pregnancy - Urinary 

Tract Infection





Additional Instructions:  


Follow up with primary care provider or OB doctor as soon as possible.  Drink 

plenty of fluids.  Take Tylenol for your pain.


Scripts


Cephalexin (KEFLEX) 500 Mg Capsule


1 CAP PO TID, #21 CAP


   Prov: JAKE FREY         12/3/21











JAKE FREY             Dec 3, 2021 10:42

## 2021-12-06 NOTE — NUR
IP: Attempted to contact pt concerning covid results. Phone number is to a business so did 
not leave a voicemail.